# Patient Record
(demographics unavailable — no encounter records)

---

## 2024-10-08 NOTE — HISTORY OF PRESENT ILLNESS
[Y] : Positive pregnancy history [Currently Active] : currently active [Men] : men [No] : No [Patient refuses STI testing] : Patient refuses STI testing [FreeTextEntry1] : 46 y/o pt presents in office today for some abnormal bleeding. She that that she has been bleeding since sept 7th with no day of spotting.  [LMPDate] : 09/07/24 [PGxTotal] : 1 [[facility] : [facility] [[facility]

## 2024-10-08 NOTE — PROCEDURE
[Abnormal Uterine Bleeding] : abnormal uterine bleeding [Transvaginal Ultrasound] : transvaginal ultrasound [Anteverted] : anteverted [FreeTextEntry3] : thickened heterogenous EE 11 to 12 mm with small clot vs sac: correlate with pregnancy test [FreeTextEntry7] : not seen [FreeTextEntry8] : small follicle in left ov [FreeTextEntry4] : see comments

## 2024-10-08 NOTE — PLAN
[FreeTextEntry1] : here with prolonged cycle/ AUB mom has hx of em cancer of note  on sono today: thickened heterogenous EE with small fluid  advised to just proceed with D and C hysteroscopy: advised to do with Dr Hill, she met with her today also r/b/a discussed d/w pt em biopsy in office, but pt fine to proceed with D and C

## 2024-10-08 NOTE — HISTORY OF PRESENT ILLNESS
[Y] : Positive pregnancy history [Currently Active] : currently active [Men] : men [No] : No [Patient refuses STI testing] : Patient refuses STI testing [FreeTextEntry1] : 46 y/o pt presents in office today for some abnormal bleeding. She that that she has been bleeding since sept 7th with no day of spotting.  [LMPDate] : 09/07/24 [PGxTotal] : 1 [facility] [[facility]